# Patient Record
Sex: MALE | URBAN - METROPOLITAN AREA
[De-identification: names, ages, dates, MRNs, and addresses within clinical notes are randomized per-mention and may not be internally consistent; named-entity substitution may affect disease eponyms.]

---

## 2023-06-26 ENCOUNTER — VIRTUAL PHONE E/M (OUTPATIENT)
Dept: GENETICS | Facility: HOSPITAL | Age: 33
End: 2023-06-26
Attending: GENETIC COUNSELOR, MS
Payer: COMMERCIAL

## 2023-06-26 NOTE — PROGRESS NOTES
TELEPHONE CONSULTATION    Referring Provider:                    Stevo Uriostegui MD     Additional Provider(s):              Steve Cameron MS ([address], phone: [phone])     Reason for Referral:  Mindy Melchor wife, Adam Moraes, had genetic testing performed on 23 because of a personal and family history of retinitis pigmentosa (RP). Mr. Adrianna Baker and his wife are in the process of pursuing IVF with [facility] and are working with Dr. Zaheer Eugene and [address] [place] [address]. In order to proceed, carrier screening on Mr. Adrianna Baker for [facility] and ABCA4 pathogenic variants was recommended. Mr. Adrianna Baker is 28years-old and has no personal history of RP. Social History:  Mr. Adrianna Baker was present for his wife's genetic consultation with me on 23. Today's discussion took place by telephone. Family History:  A three generation pedigree was obtained from Mr. Adrianna Baker and his wife on 23. Mr. Adrianna Baker has one older sister and no brothers. His two nephews may have autism spectrum disorder but neither has been formally diagnosed. Mr. Washington Chandler sister and a paternal female cousin recently received clinical diagnoses of Benny-Danlos syndrome. Mr. Washington Chandler mother is 58years-old and has two brothers and one sister. Mr. Washington Chandler maternal grandmother is living in her [de-identified] and has had skin cancer. Mr. Washington Chandler maternal grandfather  in his early 80s. Mr. Washington Chandler father is 59years-old and has two brothers and two sisters. Mr. Washington Chandler paternal grandmother is living in her [de-identified]. Mr. Washington Chandler paternal grandfather  in his 76s. Ms. Adrianna Baker has one younger brother and no sisters. Ms. Washington Chandler mother is 46years-old and was diagnosed with ovarian cancer in . Ms. Washington Chandler mother had three brothers and one sister. Ms. Washington Chandler maternal grandmother  at an unspecified age from a stroke and may have also had ovarian cancer. Ms. Washington Chandler maternal grandfather  at an unspecified older age from a head and neck cancer. Ms. Rusty Spann father is 46years-old; he was diagnosed with RP in his late 35s. Ms. Rusty Spann father had two brothers, neither of whom reportedly has RP. Ms. Rusty Spann paternal grandmother and her father reportedly had RP. Ms. Rusty Spann paternal grandfather  at an unspecified older age from pancreatic cancer. MrJourdan and Ms. Nhi Hernandez denied any other known family history of birth defects, intellectual disabilities, autism, congenital hearing loss/deafness, infertility, recurrent pregnancy loss (>2), or known genetic conditions. Please see the pedigree for additional family history information. Familial Genetic Testing Results:  Mr. Rusty Spann wife, Camilo Laureano, tested positive for two pathogenic variants associated with retinitis pigmentosa (RP), RHO c.1040C>T (p.[id number]) and ABCA4 c.2588G>C (p.Pcf866Ksb), were identified. No other known pathogenic variants were found 328 additional genes. Please refer to the report from [facility] ([id number]) for additional testing information. Mr. Rusty Spann wife's blood was drawn on 02/15/23 through Dr. Mesfin Alejo' office for reproductive carrier screening at [facility]. Ms. Nhi Hernandez screened negative for known pathogenic variants associated with alpha-thalassemia (four intact alpha-globin genes), cystic fibrosis, FMR1-related disorders (29 and 30 CGG repeat), HBB-related disorders, spinal muscular atrophy (SMA) (2 copies of SMN1, g.56675D>G and g.27706_27707delAT absent), and 90 additional conditions including factor V Leiden thrombophilia and prothrombin [id number]. Ms. Nhi Hernandez reports that she had negative BRCA1/2 testing performed through her gynecologist's office around . She does not remember the details of what was included in her 1 testing. Counseling: The following information was reviewed with Mr. Nhi Hernandez today. ABCA4:  Mr. Rusty Spann wife carries a single ABCA4 pathogenic variant, c.2588G>C (p.Fnw080Lmo), that is associated with autosomal recessive retinopathies. Carriers of a single ABCA4 pathogenic variant, in the absence of a second disease-causing mutation, are not believed to be at-risk to develop a retinopathy but are at 25% chance of having an affected child, in each pregnancy together, if their reproductive partner is also a carrier of an ABCA4 pathogenic variant. Carrier testing on Mr. Ney Aguilera is recommended to clarify the risk for the couple to have a child with an ABCA4-related retinopathy. RHO:  Mr. Ney Aguilera wife carries a single RHO pathogenic variant, c.1040C>T (p.[id number]), that has been described in families with autosomal dominant RP and is likely to be the cause of RP in Ms. Evan Sidhu and her paternal relatives. Familial testing would further establish that this variant is the cause of RP in Ms. Ney Aguilera family and is in progress. Each of Ms. Medel's children has a 50% chance to inherit this variant from Ms. Evan Sidhu. Children who inherit this specific variant are likely to develop RP but it would not be possible to predict the age-of-onset nor clinical severity as these can be variable even between family members. Tabby Davis is recommending that Mr. Evan Sidhu be screened for [facility] pathogenic variants in addition to ABCA4 pathogenic variants since autosomal recessive RP due to biallelic RHO pathogenic variants has been described. If Mr. Evan Sidhu and his wife both carry RHO pathogenic variants, there is 25% chance in each spontaneous pregnancy together, to have a child who inherits both pathogenic variants who could be more severely affected. Genetic Testing: The pros, cons, and limitations of genetic testing for ABCA4 and RHO pathogenic variants alone or as part of [facility]'s Inherited Retinal Disorders Panel were discussed including the potential implications of test results on clinical management.      If a pathogenic variant is not identified (negative result), it is still possible that Mr. Evan Sidhu has a pathogenic variant in one of these genes that was not detected by the genetic test although testing negative significantly reduces the chance for Mr. Briseyda Apple to carry a pathogenic variant in the genes analyzed. A variant of uncertain significance is a DNA change that may or may not alter the function of the gene; therefore, it is usually not possible to determine if the gene variant is associated with RP. If Mr. Briseyda Apple tests positive for a pathogenic variant, the implications of these results for Mr. Briseyda Apple and his children will depend on the gene involved and, in some cases, particular variant. For example. If Mr. Briseyda Apple happens to carry the same ABCA4 low penetrance variant as his wife, each of their children has a 25% chance to inherit both pathogenic variants; however, this variant has been described in the homozygous form in individuals without clinical symptoms and may not be sufficient to cause disease. However, If Mr. Briseyda Apple carries a severe ABCA4 pathogenic variant, then each of his children with his wife have a 25% chance together to have a child with autosomal recessive Stargardt disease or RP. Conversely, if Mr. Briseyda Apple tests positive for a RHO pathogenic variant, Mr. Briseyda Apple may be predisposed to develop RP himself, although, in the absence of a known family history, the age at which symptoms may develop as well as how they would progress would be difficult to predict. If Mr. Briseyda Apple pursue testing as part of [facility]'s [facility] Inherited Retinal Disorders panel, he may also be found to carry a pathogenic variant in a gene other than ABCA4 or RHO and some of the genes in this panel are associated with other inherited retinopathies and/or follow multiple patterns of inheritance. For this reason familial testing may be recommended to clarify the significance of any pathogenic variant(s) identified in Mr. Briseyda Apple.  I also explained that identification of a pathogenic variant(s) would not necessarily predict how or the degree to which a child who inherits the same pathogenic variant(s) would be affected. Genetic test results have implications for the entire biological family. Thus, it is recommended that he share his genetic test results with her biological family members so that they may have their risk assessed. Genetic Information Non-Discrimination Act: The legal protections of the Genetic Information Nondiscrimination Act (ROHIT) for health insurance and employment were discussed. ROHIT does not provide protection for life insurance, disability or long-term care insurance. Summary and Plan:  Mr. Murray Mcgowan was referred for genetic testing for retinitis pigmentosa (RP). Mr. Murray Mcgowan and his wife are pursing IVF with [facility]. Based on his wife's test results, Mr. Murray Mcgowan should be tested at least for ABCA4 pathogenic variants and Rodríguez Han is recommending that he also be screened for [facility] pathogenic variants, which is not unreasonable. The pros and cons of testing for pathogenic variants in these two genes alone, or as part of [facility]'s Inherited Retinal Disorders panel were discussed. At the conclusion of the counseling session Mr. Murray Mcgowan decided to proceed with ABCA4 and RHO. I will ask [facility] to mail a saliva test kit to Mr. Miya Hogan home address so that he can collect a sample at home and submit the sample directly to [facility]. I anticipate that Mr. Miya Hogan results will be available within 2-3 weeks from the time that [facility] receives the sample and will call him with the results. Results will also be communicated to Dr. Marcial Rudolph and Ms. James Castellon at [facility]. Approximately 10 minutes was spent in consultation with Mr. Murray Mcgowan today.

## 2023-07-14 ENCOUNTER — GENETICS ENCOUNTER (OUTPATIENT)
Dept: HEMATOLOGY/ONCOLOGY | Facility: HOSPITAL | Age: 33
End: 2023-07-14

## 2023-07-14 NOTE — PROGRESS NOTES
Referring Provider:                    Rod Odell MD     Additional Provider(s):              Maricarmen Kaiser MS ([address] [place] [address], phone: [phone], fax: [phone])     Reason for Referral:  Colin Sandoval submitted a saliva sample directly to [facility] for genetic testing on 06/29/23 for [facility] and ABCA4-related retinitis pigmentosa (RP). Genetic Test Results:  NEGATIVE - No pathogenic variants were detected in ABCA4 or RHO. Please refer to the report from [facility] ([id number]) for additional information. These results were discussed with Mr. Carlo Merritt by phone on 07/13/23. Summary and Plan:  Mr. Carlo Merritt and his wife are in the process of pursuing IVF with [facility] and are working with Dr. Cuong Malagon and [address] [place] [address]. Mr. Kamron James wife has RP and tested positive for two pathogenic variants associated with retinitis pigmentosa (RP), RHO c.1040C>T (p.[id number]) and ABCA4 c.2588G>C (p.[id number]),  In order to proceed with IVF/[facility], genetic testing on Mr. Carlo Merritt for [facility] and ABCA4 pathogenic variants was recommended. Mr. Carlo Merritt is 28years-old and has no personal history of RP. These results indicate that it is unlikely that Mr. Carlo Merritt carries a pathogenic variant in either [facility] or ABCA4. I will forward these results to Dr. Cuong Malagon and Ms. Araceli Mason at [address] [place] [address]. Approximately 10 minutes was spent in consultation with Mr. Carlo Merritt today.